# Patient Record
Sex: MALE | Race: OTHER | HISPANIC OR LATINO
[De-identification: names, ages, dates, MRNs, and addresses within clinical notes are randomized per-mention and may not be internally consistent; named-entity substitution may affect disease eponyms.]

---

## 2023-06-05 PROBLEM — Z00.00 ENCOUNTER FOR PREVENTIVE HEALTH EXAMINATION: Status: ACTIVE | Noted: 2023-06-05

## 2023-06-06 ENCOUNTER — APPOINTMENT (OUTPATIENT)
Dept: SURGERY | Facility: CLINIC | Age: 38
End: 2023-06-06
Payer: COMMERCIAL

## 2023-06-06 VITALS
HEART RATE: 82 BPM | WEIGHT: 180 LBS | HEIGHT: 67 IN | OXYGEN SATURATION: 99 % | DIASTOLIC BLOOD PRESSURE: 80 MMHG | TEMPERATURE: 97.6 F | SYSTOLIC BLOOD PRESSURE: 112 MMHG | BODY MASS INDEX: 28.25 KG/M2

## 2023-06-06 DIAGNOSIS — K64.1 SECOND DEGREE HEMORRHOIDS: ICD-10-CM

## 2023-06-06 DIAGNOSIS — Z78.9 OTHER SPECIFIED HEALTH STATUS: ICD-10-CM

## 2023-06-06 DIAGNOSIS — F17.200 NICOTINE DEPENDENCE, UNSPECIFIED, UNCOMPLICATED: ICD-10-CM

## 2023-06-06 PROCEDURE — 99203 OFFICE O/P NEW LOW 30 MIN: CPT | Mod: 25

## 2023-06-06 PROCEDURE — 46600 DIAGNOSTIC ANOSCOPY SPX: CPT

## 2023-06-10 PROBLEM — F17.200 CURRENT SOME DAY SMOKER: Status: ACTIVE | Noted: 2023-06-06

## 2023-06-10 PROBLEM — Z78.9 SOCIAL ALCOHOL USE: Status: ACTIVE | Noted: 2023-06-06

## 2023-06-12 PROBLEM — K64.1 GRADE II HEMORRHOIDS: Status: ACTIVE | Noted: 2023-06-12

## 2023-06-12 NOTE — PHYSICAL EXAM
[FreeTextEntry1] : General: Awake, Alert, No Acute Distress\par Respiratory: Normal Respiratory Effort\par Cardiovascular: Normal Rate and Rhythm\par Rectal: External examination shows no significant abnormalities, no fissures, fistulas, abscesses, excoriations, or condyloma.\par \par

## 2023-06-12 NOTE — ADDENDUM
[FreeTextEntry1] : I, Kaela Bliss (scrib) assisted in filling out this chart under the dictation of Dr. Ajay Morgan on 06/06/2023.\par \par \par

## 2023-06-12 NOTE — PROCEDURE
[FreeTextEntry1] : Digital rectal exam and anoscopy show normal sphincter tone, normal internal hemorrhoids, normal rectal mucosa, and no masses. No obvious large inflamed hemorrhoid tissue was identified.\par \par

## 2023-06-12 NOTE — HISTORY OF PRESENT ILLNESS
[FreeTextEntry1] : Patient is a 37-year-old male with no PMHx and no PSHx who presents for evaluation\par of an anal lesion. Patient reports bowel movements every other day without issues of constipation or diarrhea. He does notice with bowel movements a small amount of tissue that can prolapse and spontaneously reduce. It is intermittent and not associated with bleeding, pain, or purulence. He does notice pressure in the lower abdomen that is intermittent. He otherwise feels well. He's tolerating a diet and denies reason, fevers, chills, nausea, vomiting, constipation, or diarrhea. He denies a family history of colon cancer, rectal cancer, or inflammatory bowel disease. He has not previously had a colonoscopy.\par

## 2023-06-12 NOTE — ASSESSMENT
[FreeTextEntry1] : 37-year-old male with possible grade two hemorrhoids.\par \par I spoke to the patient and his wife regarding his condition. He may have a degree of constipation leading to hemorrhoidal symptoms. I recommend that a high-fiber diet, fiber supplementation, and increased water intake. He will return in six months. Should he have continued symptoms, we will proceed with a colonoscopy.\par

## 2023-12-01 ENCOUNTER — APPOINTMENT (OUTPATIENT)
Dept: PODIATRY | Facility: CLINIC | Age: 38
End: 2023-12-01
Payer: COMMERCIAL

## 2023-12-01 ENCOUNTER — OUTPATIENT (OUTPATIENT)
Dept: OUTPATIENT SERVICES | Facility: HOSPITAL | Age: 38
LOS: 1 days | End: 2023-12-01
Payer: COMMERCIAL
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
## 2023-12-01 DIAGNOSIS — L08.1 ERYTHRASMA: ICD-10-CM

## 2023-12-01 DIAGNOSIS — Z00.00 ENCOUNTER FOR GENERAL ADULT MEDICAL EXAMINATION WITHOUT ABNORMAL FINDINGS: ICD-10-CM

## 2023-12-01 DIAGNOSIS — B35.3 TINEA PEDIS: ICD-10-CM
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
## 2023-12-01 PROCEDURE — 99213 OFFICE O/P EST LOW 20 MIN: CPT

## 2023-12-01 RX ORDER — CLINDAMYCIN PHOSPHATE 1 G/10ML
1 GEL TOPICAL 3 TIMES DAILY
Qty: 1 | Refills: 0 | Status: ACTIVE | COMMUNITY
Start: 2023-12-01 | End: 1900-01-01

## 2023-12-05 ENCOUNTER — APPOINTMENT (OUTPATIENT)
Dept: SURGERY | Facility: CLINIC | Age: 38
End: 2023-12-05

## 2023-12-19 RX ORDER — TERBINAFINE HYDROCHLORIDE 250 MG/1
250 TABLET ORAL DAILY
Qty: 14 | Refills: 0 | Status: ACTIVE | COMMUNITY
Start: 2023-12-01 | End: 1900-01-01

## 2023-12-19 NOTE — PHYSICAL EXAM
[Ankle Swelling (On Exam)] : not present [Varicose Veins Of Lower Extremities] : not present [] : not present [Delayed in the Right Toes] : capillary refills normal in right toes [FreeTextEntry1] : Heavy maceration to R 4th webspace. Serous drainage. No purulence. No malodors. Mild erythema noted.  Red florescence noted to 4th webspace under wood lamp.

## 2023-12-19 NOTE — HISTORY OF PRESENT ILLNESS
[FreeTextEntry1] : 37M w/ no prior pmhx. Presents with painful right lesion to 4th webspace of right foot. Patient says "it feels wet and gets itchy and painful." Patient has tried lotrimin topical with little to no alleviation of symptoms. Denies f/c/n/v/sob. Denies drainage other than clear drainage. 
Spine appears normal, range of motion is not limited, no muscle or joint tenderness

## 2023-12-19 NOTE — ASSESSMENT
[FreeTextEntry1] : Erythrasma  Discussed etiology of disease and treatment options  Rx; Clindamycin phosphate topical to be applied daily until resolution of symptoms Rx; Terbinafine 250mg po qd for 14 days.   RTC 6 weeks